# Patient Record
Sex: MALE | URBAN - METROPOLITAN AREA
[De-identification: names, ages, dates, MRNs, and addresses within clinical notes are randomized per-mention and may not be internally consistent; named-entity substitution may affect disease eponyms.]

---

## 2017-06-08 ENCOUNTER — TELEPHONE (OUTPATIENT)
Dept: FAMILY MEDICINE | Facility: CLINIC | Age: 72
End: 2017-06-08

## 2017-06-08 NOTE — TELEPHONE ENCOUNTER
Back Pain      Duration: since Monday 6/6/2017        Specific cause: none    Description:   Location of pain: low back left and stomach ( very bloated)  Character of pain: fullness and cramping  Pain radiation:none  New numbness or weakness in legs, not attributed to pain:  no     Intensity: Currently 5/10    History:   Pain interferes with job: YES,   History of back problems: no prior back problems  Any previous MRI or X-rays: None  Sees a specialist for back pain:  No  Therapies tried without relief: activity, heat, NSAIDs, rest, standing and stretch    Alleviating factors:   Improved by: heat      Precipitating factors:  Worsened by: Sitting    Denies : CP, SOB, headaches, nausea, vomiting, diarrhea, fevers, chills sweats.  Pt also stating he is constipated he has not had a BM since Monday 6/6/2017 - he suually has a BM daily     Has taken 3 Doculax yesterday and no luck -  Pt stated he is very bloated and has not been hungry for a few days     RN advised he should try heat on his low back as with milk of mag or mag citrate. As with pushing fluids and we will see him tomorrow     If things worsen over night to go to the ER       Patient stated an understanding and agreed with plan.      Kacie Thompson RN, BSN  [facility]